# Patient Record
Sex: MALE | Race: WHITE | NOT HISPANIC OR LATINO | Employment: FULL TIME | URBAN - NONMETROPOLITAN AREA
[De-identification: names, ages, dates, MRNs, and addresses within clinical notes are randomized per-mention and may not be internally consistent; named-entity substitution may affect disease eponyms.]

---

## 2019-11-26 ENCOUNTER — OFFICE VISIT (OUTPATIENT)
Dept: INTERNAL MEDICINE | Facility: CLINIC | Age: 54
End: 2019-11-26

## 2019-11-26 VITALS
TEMPERATURE: 97.9 F | HEART RATE: 68 BPM | OXYGEN SATURATION: 98 % | BODY MASS INDEX: 33.8 KG/M2 | SYSTOLIC BLOOD PRESSURE: 124 MMHG | HEIGHT: 69 IN | WEIGHT: 228.2 LBS | DIASTOLIC BLOOD PRESSURE: 76 MMHG

## 2019-11-26 DIAGNOSIS — R06.83 SNORING: ICD-10-CM

## 2019-11-26 DIAGNOSIS — I10 ESSENTIAL HYPERTENSION: ICD-10-CM

## 2019-11-26 DIAGNOSIS — J01.10 ACUTE NON-RECURRENT FRONTAL SINUSITIS: Primary | ICD-10-CM

## 2019-11-26 PROCEDURE — 99203 OFFICE O/P NEW LOW 30 MIN: CPT | Performed by: INTERNAL MEDICINE

## 2019-11-26 RX ORDER — AZITHROMYCIN 250 MG/1
TABLET, FILM COATED ORAL
Qty: 5 TABLET | Refills: 0 | OUTPATIENT
Start: 2019-11-26 | End: 2019-11-30 | Stop reason: HOSPADM

## 2019-11-26 RX ORDER — CLONIDINE HYDROCHLORIDE 0.1 MG/1
0.05 TABLET ORAL ONCE
Status: COMPLETED | OUTPATIENT
Start: 2019-11-26 | End: 2019-11-26

## 2019-11-26 RX ORDER — METHYLPREDNISOLONE 4 MG/1
TABLET ORAL
Qty: 21 EACH | Refills: 0 | OUTPATIENT
Start: 2019-11-26 | End: 2019-11-30 | Stop reason: HOSPADM

## 2019-11-26 RX ORDER — LISINOPRIL 10 MG/1
10 TABLET ORAL DAILY
COMMUNITY
End: 2019-11-30 | Stop reason: SDUPTHER

## 2019-11-26 RX ORDER — CLONIDINE HYDROCHLORIDE 0.1 MG/1
0.1 TABLET ORAL 2 TIMES DAILY PRN
Qty: 10 TABLET | Refills: 0 | Status: SHIPPED | OUTPATIENT
Start: 2019-11-26 | End: 2020-06-09

## 2019-11-26 RX ORDER — PROPRANOLOL HYDROCHLORIDE 10 MG/1
10 TABLET ORAL DAILY
Refills: 11 | COMMUNITY
Start: 2019-11-20

## 2019-11-26 RX ADMIN — CLONIDINE HYDROCHLORIDE 0.05 MG: 0.1 TABLET ORAL at 15:02

## 2019-11-26 NOTE — PROGRESS NOTES
"        Subjective     Chief Complaint   Patient presents with   • Dizziness   • Nausea       History of Present Illness  Patient has been taking his BP medications. Sunday he started feeling off. He initially thought it was fever. No other sickness. His head just feels off. He states that he just feels warm, like he's about to get sick. If he moves around too much then he gets woozy. [place] nauseated when he thought about getting lunch earlier.   Son was sick last week, but patient has not had a cold recently.   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 Dizzy with looking around quickly and getting up from a seated position quickly. Has not missed any steps or fallen. Patient states that he has chronic sinus issues.   No chest pain and no SOA. No chills. No blood in stool or urine.   Since seeing Dr. Noonan, only other thing off was a left sided \"cracked rib.\" Doesn't feel acute but is positional with the pain. Described as an ache.     Patient's PMR from outside medical facility reviewed and noted.        Review of Systems   Constitutional: Negative for chills and fever.   HENT: Negative for ear pain and rhinorrhea.    Respiratory: Negative for cough and shortness of breath.    Cardiovascular: Negative for chest pain and leg swelling.   Gastrointestinal: Negative for constipation, diarrhea, nausea and vomiting.   Genitourinary: Negative for dysuria and hematuria.   Skin: Negative for wound.        Flushing   Neurological: Positive for dizziness.      Frontal pressure with bending forward.     Otherwise complete ROS reviewed and negative except as mentioned in the HPI.    Past Medical History:   Past Medical History:   Diagnosis Date   • Hypertension    Has HX of kidney stones.     Past Surgical History:  Past Surgical History:   Procedure Laterality Date   • KNEE SURGERY  1992     Social History:  reports that he has never smoked. He has never used smokeless tobacco. He reports that he does not use " "drugs.    Family History: family history includes Heart attack in his father; Stroke in his mother.       Allergies:  No Known Allergies  Medications:  Prior to Admission medications    Medication Sig Start Date End Date Taking? Authorizing Provider   lisinopril (PRINIVIL,ZESTRIL) 10 MG tablet Take 10 mg by mouth Daily.   Yes Jose Alejandro Corey MD   propranolol (INDERAL) 10 MG tablet Take 10 mg by mouth Daily. 11/20/19  Yes ProviderJose Alejandro MD       Objective     Vital Signs: /76 (BP Location: Right arm, Patient Position: Sitting, Cuff Size: Adult)   Pulse 68   Temp 97.9 °F (36.6 °C) (Temporal)   Ht 175.3 cm (69\")   Wt 104 kg (228 lb 3.2 oz)   SpO2 98%   BMI 33.70 kg/m²   Physical Exam   Constitutional: He appears well-developed and well-nourished.   HENT:   Head: Normocephalic and atraumatic.   Nose: Nose normal.   Mouth/Throat: Oropharynx is clear and moist.   Eyes: Conjunctivae and EOM are normal.   Neck: Normal range of motion. Neck supple.   Cardiovascular: Normal rate, regular rhythm and normal heart sounds.   Pulmonary/Chest: Effort normal and breath sounds normal.   Abdominal: Soft. Bowel sounds are normal.   Musculoskeletal: Normal range of motion. He exhibits no edema or tenderness.   Strength +5/5 upper and lower extremity bilaterally.    Neurological: He is alert. No cranial nerve deficit.   Skin: Skin is warm and dry.   Psychiatric: He has a normal mood and affect.   Vitals reviewed.    No lateral nystagmus    Patient's Body mass index is 33.7 kg/m². BMI is within normal parameters. No follow-up required..      Results Reviewed:  No results found for: GLUCOSE, BUN, CREATININE, NA, K, CL, CO2, CALCIUM, ALT, AST, WBC, HCT, PLT, CHOL, TRIG, HDL, LDL, LDLHDL, HGBA1C      Assessment / Plan     Assessment/Plan:  1. Acute non-recurrent frontal sinusitis  - azithromycin (ZITHROMAX Z-KADE) 250 MG tablet; Take 2 tablets the first day, then 1 tablet daily for 4 days.  Dispense: 5 tablet; Refill: " 0  - methylPREDNISolone (MEDROL, KADE,) 4 MG tablet; Take as directed on package instructions.  Dispense: 21 each; Refill: 0    2. Essential hypertension  - cloNIDine (CATAPRES) 0.1 MG tablet; Take 1 tablet by mouth 2 (Two) Times a Day As Needed for High Blood Pressure (SBP greater than 160).  Dispense: 10 tablet; Refill: 0  - cloNIDine (CATAPRES) tablet 0.05 mg    3. Snoring with possible apnea episodes  - Home Sleep Study; Future      BP improved after 1/2 clonidine 0.1mg    Return in about 1 week (around 12/3/2019). unless patient needs to be seen sooner or acute issues arise.    Code Status: Full    I have discussed the patient results/orders and and plan/recommendation with them at today's visit.      Faheem Galo, DO   11/26/2019

## 2019-11-30 ENCOUNTER — HOSPITAL ENCOUNTER (EMERGENCY)
Facility: HOSPITAL | Age: 54
Discharge: HOME | End: 2019-11-30
Attending: EMERGENCY MEDICINE | Admitting: EMERGENCY MEDICINE

## 2019-11-30 ENCOUNTER — APPOINTMENT (OUTPATIENT)
Dept: CT IMAGING | Facility: HOSPITAL | Age: 54
End: 2019-11-30

## 2019-11-30 VITALS
SYSTOLIC BLOOD PRESSURE: 125 MMHG | WEIGHT: 220 LBS | DIASTOLIC BLOOD PRESSURE: 69 MMHG | HEART RATE: 72 BPM | BODY MASS INDEX: 32.58 KG/M2 | TEMPERATURE: 97.8 F | HEIGHT: 69 IN | RESPIRATION RATE: 13 BRPM | OXYGEN SATURATION: 94 %

## 2019-11-30 DIAGNOSIS — F41.9 ANXIETY: ICD-10-CM

## 2019-11-30 DIAGNOSIS — I10 HYPERTENSION, UNSPECIFIED TYPE: Primary | ICD-10-CM

## 2019-11-30 LAB
ANION GAP SERPL CALCULATED.3IONS-SCNC: 10 MMOL/L (ref 5–15)
BASOPHILS # BLD AUTO: 0.04 10*3/MM3 (ref 0–0.2)
BASOPHILS NFR BLD AUTO: 0.5 % (ref 0–1.5)
BUN BLD-MCNC: 12 MG/DL (ref 6–20)
BUN/CREAT SERPL: 15 (ref 7–25)
CALCIUM SPEC-SCNC: 9.5 MG/DL (ref 8.6–10.5)
CHLORIDE SERPL-SCNC: 100 MMOL/L (ref 98–107)
CO2 SERPL-SCNC: 26 MMOL/L (ref 22–29)
CREAT BLD-MCNC: 0.8 MG/DL (ref 0.76–1.27)
DEPRECATED RDW RBC AUTO: 39.7 FL (ref 37–54)
EOSINOPHIL # BLD AUTO: 0.27 10*3/MM3 (ref 0–0.4)
EOSINOPHIL NFR BLD AUTO: 3.1 % (ref 0.3–6.2)
ERYTHROCYTE [DISTWIDTH] IN BLOOD BY AUTOMATED COUNT: 13.2 % (ref 12.3–15.4)
GFR SERPL CREATININE-BSD FRML MDRD: 101 ML/MIN/1.73
GLUCOSE BLD-MCNC: 118 MG/DL (ref 65–99)
HCT VFR BLD AUTO: 44.1 % (ref 37.5–51)
HGB BLD-MCNC: 15.5 G/DL (ref 13–17.7)
IMM GRANULOCYTES # BLD AUTO: 0.02 10*3/MM3 (ref 0–0.05)
IMM GRANULOCYTES NFR BLD AUTO: 0.2 % (ref 0–0.5)
LYMPHOCYTES # BLD AUTO: 3.11 10*3/MM3 (ref 0.7–3.1)
LYMPHOCYTES NFR BLD AUTO: 35.6 % (ref 19.6–45.3)
MCH RBC QN AUTO: 29.2 PG (ref 26.6–33)
MCHC RBC AUTO-ENTMCNC: 35.1 G/DL (ref 31.5–35.7)
MCV RBC AUTO: 83.1 FL (ref 79–97)
MONOCYTES # BLD AUTO: 0.86 10*3/MM3 (ref 0.1–0.9)
MONOCYTES NFR BLD AUTO: 9.8 % (ref 5–12)
NEUTROPHILS # BLD AUTO: 4.44 10*3/MM3 (ref 1.7–7)
NEUTROPHILS NFR BLD AUTO: 50.8 % (ref 42.7–76)
NRBC BLD AUTO-RTO: 0 /100 WBC (ref 0–0.2)
PLATELET # BLD AUTO: 239 10*3/MM3 (ref 140–450)
PMV BLD AUTO: 8.9 FL (ref 6–12)
POTASSIUM BLD-SCNC: 4.2 MMOL/L (ref 3.5–5.2)
RBC # BLD AUTO: 5.31 10*6/MM3 (ref 4.14–5.8)
SODIUM BLD-SCNC: 136 MMOL/L (ref 136–145)
TROPONIN T SERPL-MCNC: <0.01 NG/ML (ref 0–0.03)
WBC NRBC COR # BLD: 8.74 10*3/MM3 (ref 3.4–10.8)

## 2019-11-30 PROCEDURE — 70450 CT HEAD/BRAIN W/O DYE: CPT

## 2019-11-30 PROCEDURE — 99284 EMERGENCY DEPT VISIT MOD MDM: CPT

## 2019-11-30 PROCEDURE — 85025 COMPLETE CBC W/AUTO DIFF WBC: CPT | Performed by: EMERGENCY MEDICINE

## 2019-11-30 PROCEDURE — 93005 ELECTROCARDIOGRAM TRACING: CPT | Performed by: EMERGENCY MEDICINE

## 2019-11-30 PROCEDURE — 36415 COLL VENOUS BLD VENIPUNCTURE: CPT

## 2019-11-30 PROCEDURE — 80048 BASIC METABOLIC PNL TOTAL CA: CPT | Performed by: EMERGENCY MEDICINE

## 2019-11-30 PROCEDURE — 93010 ELECTROCARDIOGRAM REPORT: CPT | Performed by: INTERNAL MEDICINE

## 2019-11-30 PROCEDURE — 84484 ASSAY OF TROPONIN QUANT: CPT | Performed by: EMERGENCY MEDICINE

## 2019-11-30 RX ORDER — ALPRAZOLAM 0.5 MG/1
0.5 TABLET ORAL ONCE
Status: COMPLETED | OUTPATIENT
Start: 2019-11-30 | End: 2019-11-30

## 2019-11-30 RX ORDER — ALPRAZOLAM 0.25 MG/1
0.25 TABLET ORAL 2 TIMES DAILY PRN
Qty: 7 TABLET | Refills: 0 | Status: SHIPPED | OUTPATIENT
Start: 2019-11-30 | End: 2020-06-09

## 2019-11-30 RX ORDER — LISINOPRIL 10 MG/1
20 TABLET ORAL DAILY
Qty: 30 TABLET | Refills: 0 | Status: SHIPPED | OUTPATIENT
Start: 2019-11-30

## 2019-11-30 RX ADMIN — ALPRAZOLAM 0.5 MG: 0.5 TABLET ORAL at 21:11

## 2019-12-01 NOTE — ED PROVIDER NOTES
Subjective   53 y/o male arrives for evaluation of elevated blood pressure. The patient has noted his blood pressure to be high recently prompting him to actually visit a local walk in clinic in which he was told to increase his lisinopril and also was given clonidine. He states he has taken medication around 4 pm but noted his BP was very high after this. He has also noted some tingling to his left face. He noted this when he went to the walk in clinic and denies any weakness, ataxia, falls, trauma, actual numbness, cp, sob, fevers, chills, palpitations, flank or back pain, vision or hearing changes or other issues. He arrives in [facility].         Family, social and past history reviewed as below, prior documentation of H and Ps and other documentation are reviewed:    Past Medical History:  No date: Hypertension    Past Surgical History:  1992: KNEE SURGERY    Social History    Socioeconomic History      Marital status:       Spouse name: Not on file      Number of children: Not on file      Years of education: Not on file      Highest education level: Not on file    Tobacco Use      Smoking status: Never Smoker      Smokeless tobacco: Never Used    Substance and Sexual Activity      Drug use: No      Family history: reviewed and noncontributory             Review of Systems   All other systems reviewed and are negative.      Past Medical History:   Diagnosis Date   • Hypertension        No Known Allergies    Past Surgical History:   Procedure Laterality Date   • KNEE SURGERY  1992       Family History   Problem Relation Age of Onset   • Stroke Mother    • Heart attack Father        Social History     Socioeconomic History   • Marital status:      Spouse name: Not on file   • Number of children: Not on file   • Years of education: Not on file   • Highest education level: Not on file   Tobacco Use   • Smoking status: Never Smoker   • Smokeless tobacco: Never Used   Substance and Sexual Activity   • Drug  use: No           Objective   Physical Exam   Constitutional: He is oriented to person, place, and time. He appears well-developed and well-nourished.   HENT:   Head: Normocephalic and atraumatic.   Eyes: Conjunctivae and EOM are normal. Pupils are equal, round, and reactive to light.   Neck: Normal range of motion. Neck supple.   Cardiovascular: Normal rate, regular rhythm, normal heart sounds and intact distal pulses.   Pulmonary/Chest: Effort normal and breath sounds normal.   Abdominal: Soft. Bowel sounds are normal.   Musculoskeletal: Normal range of motion.   Neurological: He is alert and oriented to person, place, and time. He displays normal reflexes. No cranial nerve deficit or sensory deficit. He exhibits normal muscle tone. Coordination normal.   NIH IS 0   Skin: Skin is warm. Capillary refill takes less than 2 seconds.   Psychiatric: He has a normal mood and affect.   Vitals reviewed.      Procedures           ED Course    CT Head Without Contrast   Final Result   1. No acute intracranial abnormality is seen.                                                       This report was finalized on 11/30/2019 21:39 by Dr. Wilver Allen MD.        Labs Reviewed   BASIC METABOLIC PANEL - Abnormal; Notable for the following components:       Result Value    Glucose 118 (*)     All other components within normal limits    Narrative:     GFR Normal >60  Chronic Kidney Disease <60  Kidney Failure <15   CBC WITH AUTO DIFFERENTIAL - Abnormal; Notable for the following components:    Lymphocytes, Absolute 3.11 (*)     All other components within normal limits   TROPONIN (IN-HOUSE) - Normal    Narrative:     Troponin T Reference Range:  <= 0.03 ng/mL-   Negative for AMI  >0.03 ng/mL-     Abnormal for myocardial necrosis.  Clinicians would have to utilize clinical acumen, EKG, Troponin and serial changes to determine if it is an Acute Myocardial Infarction or myocardial injury due to an underlying chronic condition.    CBC  AND DIFFERENTIAL    Narrative:     The following orders were created for panel order CBC & Differential.  Procedure                               Abnormality         Status                     ---------                               -----------         ------                     CBC Auto Differential[[id number]]        Abnormal            Final result                 Please view results for these tests on the individual orders.       ED Course as of Nov 30 2204   Sat Nov 30, 2019 2102 Patient states he has tingling to his left face, given this will check CT and labs.   []   2202 After xanax, patient has no further symptoms, asking for dc. Will increase his lisinopril but he does also admit to a lot of stress at home. Given xanax helped will provide with a few xanax as well.   []      ED Course User Index  [] Esteban Larry MD                  [facility]    Final diagnoses:   Hypertension, unspecified type   Anxiety              Esteban Larry MD  11/30/19 2204

## 2019-12-06 ENCOUNTER — TELEPHONE (OUTPATIENT)
Dept: INTERNAL MEDICINE | Facility: CLINIC | Age: 54
End: 2019-12-06

## 2019-12-06 NOTE — TELEPHONE ENCOUNTER
Upon contacting patient from prior visit, patient stated he had went to the ER the other night with BP and anxiety. He will be going to see his PCP soon.    Patient also stated his medication is working and light headedness is subsiding.

## 2020-06-09 ENCOUNTER — OFFICE VISIT (OUTPATIENT)
Dept: GASTROENTEROLOGY | Facility: CLINIC | Age: 55
End: 2020-06-09

## 2020-06-09 VITALS
WEIGHT: 223 LBS | OXYGEN SATURATION: 98 % | HEIGHT: 70 IN | HEART RATE: 80 BPM | TEMPERATURE: 98 F | BODY MASS INDEX: 31.92 KG/M2 | SYSTOLIC BLOOD PRESSURE: 132 MMHG | DIASTOLIC BLOOD PRESSURE: 74 MMHG

## 2020-06-09 DIAGNOSIS — Z12.11 COLON CANCER SCREENING: Primary | ICD-10-CM

## 2020-06-09 PROCEDURE — 99203 OFFICE O/P NEW LOW 30 MIN: CPT | Performed by: NURSE PRACTITIONER

## 2020-06-09 RX ORDER — ASCORBIC ACID 500 MG
500 TABLET ORAL DAILY
COMMUNITY

## 2020-06-09 NOTE — PROGRESS NOTES
Chief Complaint   Patient presents with   • Colon Cancer Screening     Pt presents today for evaluation for screening colonoscopy     Subjective   HPI  Bob Navarro is a 55 y.o. male who presents as a referral for preventative maintenance. He has no complaints of nausea or vomiting. No change in bowels. No wt loss. No BRBPR. No melena. There is no family hx for colon cancer. No abdominal pain. There was no Cologuard screening this year. The patient has not had a colonoscopy in the past.    Past Medical History:   Diagnosis Date   • Asthma    • Generalized anxiety disorder    • Hypertension    • Hypertriglyceridemia    • Hypogonadism male    • Spastic colon    • Venous insufficiency      Past Surgical History:   Procedure Laterality Date   • KNEE SURGERY  1992       Current Outpatient Medications:   •  lisinopril (PRINIVIL,ZESTRIL) 10 MG tablet, Take 2 tablets by mouth Daily., Disp: 30 tablet, Rfl: 0  •  Multiple Vitamins-Minerals (MULTIVITAMIN ADULT PO), Take 1 capsule by mouth Daily., Disp: , Rfl:   •  propranolol (INDERAL) 10 MG tablet, Take 10 mg by mouth Daily., Disp: , Rfl: 11  •  vitamin C (ASCORBIC ACID) 500 MG tablet, Take 500 mg by mouth Daily., Disp: , Rfl:   No Known Allergies  Social History     Socioeconomic History   • Marital status:      Spouse name: Not on file   • Number of children: Not on file   • Years of education: Not on file   • Highest education level: Not on file   Tobacco Use   • Smoking status: Never Smoker   • Smokeless tobacco: Never Used   Substance and Sexual Activity   • Alcohol use: Yes     Comment: Rarely-maybe once a year   • Drug use: No     Family History   Problem Relation Age of Onset   • Stroke Mother    • Heart attack Father    • Colon polyps Neg Hx    • Colon cancer Neg Hx    • Esophageal cancer Neg Hx    • Liver cancer Neg Hx    • Liver disease Neg Hx    • Rectal cancer Neg Hx    • Stomach cancer Neg Hx        REVIEW OF SYSTEMS  General: well appearing, no fever chills  "or sweats, no unexplained wt loss  HEENT: no acute visual or hearing disturbances  Cardiovascular: No chest pain or palpitations  Pulmonary: No shortness of breath, coughing, wheezing or hemoptysis  : No burning, urgency, hematuria, or dysuria  Musculoskeletal: No joint pain or stiffness  Peripheral: no edema  Skin: No lesions or rashes  Neuro: No dizziness, headaches, stroke, syncope  Endocrine: No hot or cold intolerances  Hematological: No blood dyscrasias    Objective   Vitals:    06/09/20 1345   BP: 132/74   BP Location: Left arm   Patient Position: Sitting   Cuff Size: Adult   Pulse: 80   Temp: 98 °F (36.7 °C)   TempSrc: Tympanic   SpO2: 98%   Weight: 101 kg (223 lb)   Height: 176.5 cm (69.5\")     Body mass index is 32.46 kg/m².    PHYSICAL EXAM  General: age appropriate well nourished well appearing, no acute distress  Head: normocephalic and atraumatic  Global assessment-supple  Neck-No JVD noted, no lymphadenopathy  Pulmonary-clear to auscultation bilaterally, normal respiratory effort  Cardiovascular-normal rate and rhythm, normal heart sounds, S1 and S2 noted  Abdomen-soft, non tender, non distended, normal bowel sounds all 4 quadrants, no hepatosplenomegaly noted  Extremities-No clubbing cyanosis or edema  Neuro-Non focal, converses appropriately, awake, alert, oriented    Lab Results - Last 18 Months   Lab Units 11/30/19  2107   GLUCOSE mg/dL 118*   BUN mg/dL 12   CREATININE mg/dL 0.80   SODIUM mmol/L 136   POTASSIUM mmol/L 4.2   CHLORIDE mmol/L 100   CO2 mmol/L 26.0       Lab Results - Last 18 Months   Lab Units 11/30/19  2107   HEMOGLOBIN g/dL 15.5   HEMATOCRIT % 44.1   MCV fL 83.1   WBC 10*3/mm3 8.74   RDW % 13.2   MPV fL 8.9   PLATELETS 10*3/mm3 239         Imaging Results (Most Recent)     None        Assessment/Plan   Bob was seen today for colon cancer screening.    Diagnoses and all orders for this visit:    Colon cancer screening    The patient states that at this time he does not want to " "proceed to a colonoscopy and will call our office when he discusses this with his wife.  He states that he just got \"a new job and I do not feel that I can take 3 days off to quarantine myself for the screening for COVID.\"  * Surgery not found *       Body mass index is 32.46 kg/m². Patient's Body mass index is 32.46 kg/m². BMI is above normal parameters. Recommendations include: nutrition counseling.      All risks, benefits, alternatives, and indications of colonoscopy procedure have been discussed with the patient. Risks to include perforation of the colon requiring possible surgery or colostomy, risk of bleeding from biopsies or removal of colon tissue, possibility of missing a colon polyp or cancer, or adverse drug reaction.  Benefits to include the diagnosis and management of disease of the colon and rectum. Alternatives to include barium enema, radiographic evaluation, lab testing or no intervention. Pt verbalizes understanding and agrees.   "

## 2025-04-24 ENCOUNTER — HOSPITAL ENCOUNTER (OUTPATIENT)
Dept: CARDIOLOGY | Facility: HOSPITAL | Age: 60
Discharge: HOME | End: 2025-04-24
Payer: COMMERCIAL

## 2025-04-24 DIAGNOSIS — R93.1 ABNORMAL FINDINGS DIAGNOSTIC IMAGING OF HEART AND CORONARY CIRCULATION: Primary | ICD-10-CM

## 2025-04-24 DIAGNOSIS — R93.1 ABNORMAL FINDINGS DIAGNOSTIC IMAGING OF HEART AND CORONARY CIRCULATION: ICD-10-CM

## 2025-04-30 ENCOUNTER — HOSPITAL ENCOUNTER (OUTPATIENT)
Dept: CARDIOLOGY | Facility: HOSPITAL | Age: 60
Discharge: HOME | End: 2025-04-30

## 2025-04-30 DIAGNOSIS — R93.1 ABNORMAL FINDINGS ON DIAGNOSTIC IMAGING OF HEART AND CORONARY CIRCULATION: ICD-10-CM

## 2025-05-01 ENCOUNTER — OFFICE VISIT (OUTPATIENT)
Dept: CARDIOLOGY | Facility: CLINIC | Age: 60
End: 2025-05-01
Payer: COMMERCIAL

## 2025-05-01 VITALS
BODY MASS INDEX: 27.85 KG/M2 | OXYGEN SATURATION: 99 % | HEIGHT: 69 IN | WEIGHT: 188 LBS | SYSTOLIC BLOOD PRESSURE: 138 MMHG | DIASTOLIC BLOOD PRESSURE: 62 MMHG | HEART RATE: 64 BPM

## 2025-05-01 DIAGNOSIS — I35.1 NONRHEUMATIC AORTIC VALVE INSUFFICIENCY: Primary | ICD-10-CM

## 2025-05-01 DIAGNOSIS — E78.5 HYPERLIPIDEMIA, UNSPECIFIED HYPERLIPIDEMIA TYPE: ICD-10-CM

## 2025-05-01 DIAGNOSIS — I10 ESSENTIAL HYPERTENSION: ICD-10-CM

## 2025-05-01 DIAGNOSIS — Z91.89 AT INCREASED RISK FOR CARDIOVASCULAR DISEASE: ICD-10-CM

## 2025-05-01 PROBLEM — E11.9 TYPE 2 DIABETES MELLITUS: Status: ACTIVE | Noted: 2023-12-13

## 2025-05-01 PROCEDURE — 99204 OFFICE O/P NEW MOD 45 MIN: CPT | Performed by: INTERNAL MEDICINE

## 2025-05-01 PROCEDURE — 93000 ELECTROCARDIOGRAM COMPLETE: CPT | Performed by: INTERNAL MEDICINE

## 2025-05-01 RX ORDER — TIRZEPATIDE 5 MG/.5ML
INJECTION, SOLUTION SUBCUTANEOUS
COMMUNITY

## 2025-05-01 NOTE — PROGRESS NOTES
[facility] - CARDIOLOGY  New Patient Initial Outpatient Evaluation    Primary Care Physician: Dread Noonan MD    Subjective     Chief Complaint: valvular heart disease    History of Present Illness  The patient is a 60-year-old male who presents for referral due to an abnormal echocardiogram. He was referred by Dr. Isidro Choi following an [facility], which reported a normal ejection fraction with moderate aortic regurgitation and a detected murmur.    A significant weight loss of approximately 30 pounds is reported, achieved with the aid of Mounjaro. He mentions that his A1c levels have been unstable recently. He has been diagnosed with diabetes, but his A1c levels are currently well-controlled with Mounjaro. His most recent A1c level was 5.5.    During his annual physical examination, a murmur was detected, prompting further investigation due to his family history. He was previously evaluated by Dr. Mares at , who recommended further assessment of the murmur by Dr. Noonan's office. No symptoms of shortness of breath, chest tightness, pressure, or heaviness during physical activity are reported. He maintains an active lifestyle, although not as rigorous as during his  service, and engages in regular walking exercises. No exercise outside of his job is performed.    A tendency towards anxiety is reported, which is managed with propranolol, typically taking 2 doses of 5 mg or 10 mg in the morning along with lisinopril. Occasionally, an additional dose is required if excessive caffeine is consumed. Caffeine is avoided in the evening to ensure good sleep quality.    Cholesterol levels have been consistently below 200, with LDL levels around 90 and HDL levels between 60 and 80. Supplementation with omega-6 and MCT oil is used to balance omega-3 intake. He was previously on atorvastatin 10 mg but discontinued it due to satisfactory cholesterol  levels.    SOCIAL HISTORY  Occupations: Served in the  for 10 years; currently works for the state doing inspections.  Exercise: Walks and does inspections for work but does not engage in intentional exercise outside of work.  Diet: Drinks unsweet tea, mostly in the mornings.  Tobacco: Non-smoker.  Coffee/Tea/Caffeine-containing Drinks: Drinks unsweet tea, mostly in the mornings. Avoids it in the evening to sleep well.      Review of Systems   Constitutional: Negative for malaise/fatigue.   Cardiovascular:  Negative for chest pain, claudication, dyspnea on exertion, leg swelling, near-syncope, orthopnea, palpitations, paroxysmal nocturnal dyspnea and syncope.   Respiratory:  Negative for shortness of breath.    Hematologic/Lymphatic: Does not bruise/bleed easily.        Otherwise complete ROS reviewed and negative except as mentioned in the HPI.      Past Medical History:   Past Medical History:   Diagnosis Date    Diabetes mellitus     Generalized anxiety disorder     Heart murmur     Hyperlipidemia 4/10/2023    Hypertension     Hypogonadism male     Spastic colon     Venous insufficiency        Past Surgical History:  Past Surgical History:   Procedure Laterality Date    KNEE SURGERY  1992       Family History: family history includes Heart attack in his father; Heart disease in his father and mother; Stroke in his mother.    Social History:  reports that he has never smoked. He has been exposed to tobacco smoke. He has never used smokeless tobacco. He reports current alcohol use. He reports that he does not use drugs.    Medications:  Prior to Admission medications    Medication Sig Start Date End Date Taking? Authorizing Provider   Cholecalciferol (D3 PO) Take  by mouth.   Yes ProviderJose Alejandro MD   lisinopril (PRINIVIL,ZESTRIL) 10 MG tablet Take 2 tablets by mouth Daily. 11/30/19  Yes Esteban Larry MD Mounjaro 5 MG/0.5ML solution auto-injector INJECT 5 MG SUBCUTANEOUSLY ONCE A WEEK FOR  "DIABETES.   Yes Provider, MD Jose Alejandro   Multiple Vitamins-Minerals (MULTIVITAMIN ADULT PO) Take 1 tablet by mouth Daily.   Yes Provider, MD Jose Alejandro   propranolol (INDERAL) 10 MG tablet Take 1 tablet by mouth Daily. 11/20/19  Yes Leora, MD Jose Alejandro   vitamin C (ASCORBIC ACID) 500 MG tablet Take 1 tablet by mouth Daily.   Yes Leora, MD Jose Alejandro     Allergies:  No Known Allergies    Objective     Vital Signs: /62   Pulse 64   Ht 175.3 cm (69\")   Wt 85.3 kg (188 lb)   SpO2 99%   BMI 27.76 kg/m²     Vitals and nursing note reviewed.   Constitutional:       General: Not in acute distress.     Appearance: Not in distress.   Neck:      Vascular: No JVD or JVR. JVD normal.   Pulmonary:      Effort: Pulmonary effort is normal.      Breath sounds: Normal breath sounds.   Cardiovascular:      Normal rate. Regular rhythm.      Murmurs: There is a grade 3/4 high frequency blowing decrescendo, early diastolic murmur at the URSB, radiating to the apex.      No gallop.  No rub.   Pulses:     Intact distal pulses.   Edema:     Peripheral edema absent.   Skin:     General: Skin is warm and dry.   Neurological:      Mental Status: Alert, oriented to person, place, and time and oriented to person, place and time.       Physical Exam      Results Reviewed:  Results  Labs   - A1c: 5.5   - LDL cholesterol: 03/2024, 54    Imaging reviewed:  - Report of echocardiogram is reviewed the official report stating the left ventricle is normal in size with EF of 55 to 60%.  There reports moderate aortic valve leaflet thickening with moderate regurgitation.  - Upon my independent review of the images they were uploaded to our system from this exam (performed at another facility), the parasternal long axis views do raise concern for a mildly dilated left ventricle.  Also, I measure the vena contractor at 0.8, which would be consistent with severe aortic regurgitation.  The continuous-wave Doppler assessment of the " "regurgitant jet never appears to be a very clear envelope, but pressure half-times obtained are consistent with moderate regurgitation.        ECG 12 Lead    Date/Time: 5/1/2025 2:04 PM  Performed by: Simon Ghosh MD    Authorized by: Simon Ghosh MD  Comparison: compared with previous ECG from 11/30/2019  Comparison to previous ECG: Voltage criteria for LVH are now present, as are nonspecific T wave abnormalities  Rhythm: sinus rhythm  Rate: normal  BPM: 64  Other findings: T wave abnormality and left ventricular hypertrophy    Clinical impression: abnormal EKG            Lab Results   Component Value Date    TRIG 157 (H) 01/12/2022    HDL 40 01/12/2022     No results found for: \"HGBA1C\"        Assessment / Plan        Problem List Items Addressed This Visit          Cardiac and Vasculature    Essential hypertension    Hyperlipidemia    Nonrheumatic aortic valve insufficiency - Primary    Relevant Orders    Adult Transthoracic Echo Complete W/ Cont if Necessary Per Protocol       Other    At increased risk for cardiovascular disease       Assessment & Plan  1. Nonrheumatic aortic regurgitation.  He presents with a 3 out of 4 diastolic murmur on examination, despite a recent echocardiogram indicating normal left ventricular size and function.   - The report of the echocardiogram states the aortic regurgitation is moderate, and the left ventricular size and function are both normal.  However, upon my independent review of these images, I am concerned that the severity of aortic regurgitation is underreported in the left ventricular size is in fact bordering on dilated with very subtle reduction in global function.  - As such, I will actually recommend a follow-up echocardiogram here in September (1 year removed from his [facility]) and a visit with me thereafter, for closer surveillance and would otherwise usually be recommended for moderate aortic regurgitation with a normal LVEF and normal " size of the left ventricle     - We did discuss indications for valvular intervention    2. Cardiovascular risk reduction.  He is diabetic but currently has a well-controlled A1c level, managed with Mounjaro. He was previously on atorvastatin 10 mg but discontinued its use. Cholesterol levels from 03/2024, obtained while he was on atorvastatin, were reviewed and showed an excellent LDL level of 54. He believes his LDL level increased to the 90s after discontinuing atorvastatin, but these levels would still be considered well-controlled according to ATP III guidelines for primary care physicians. He was advised to resume atorvastatin therapy due to its class effect and the potential for an LDL level of 54 to significantly reduce his risk of myocardial infarction. Continued close follow-up with his primary care physician for further risk factor modification is recommended. He is scheduled for a follow-up visit in one year, during which another echocardiogram will be performed. However, he has been instructed to contact us sooner if he experiences any exertional limiting symptoms.    3. Diabetes Mellitus.  His diabetes is well-controlled with an A1c of 5.5, managed with Mounjaro. He is advised to continue his current medication regimen and maintain regular follow-ups with his primary care physician for ongoing management of his diabetes.    4. Hyperlipidemia.  He was previously on atorvastatin 10 mg but stopped taking it. His LDL was 54 while on the medication and has risen to the 90s after discontinuation. He is encouraged to resume atorvastatin to achieve better LDL control and reduce cardiovascular risk.    Follow-up  The patient will follow up after repeat echocardiogram in September of this year.      Transcribed from ambient dictation for Simon Ghosh MD by Simon Ghosh MD.  05/01/25   14:39 CDT    Patient or patient representative verbalized consent to the visit recording.

## 2025-05-02 PROBLEM — Z91.89 AT INCREASED RISK FOR CARDIOVASCULAR DISEASE: Status: ACTIVE | Noted: 2025-05-02

## 2025-05-05 ENCOUNTER — TELEPHONE (OUTPATIENT)
Dept: CARDIOLOGY | Facility: CLINIC | Age: 60
End: 2025-05-05
Payer: COMMERCIAL

## 2025-05-05 NOTE — TELEPHONE ENCOUNTER
Patient asks if he needs to premedicate before dental cleaning. Per Julia Delaney RN only those who have had valve replacement need premedication. Patient is notified. Willow Matthews MA

## 2025-05-05 NOTE — TELEPHONE ENCOUNTER
Simon Ghosh MD Hawkins, Tammie, MA  Please let patient know that I was able to go back after our visit and reviewed the actual images of the echocardiogram performed in [facility].  I am concerned that both the degree of aortic regurgitation as well as the size and function of the main pumping chamber may be a bit worse than was reported and what we discussed in our visit, so I'd like to actually perform his echocardiogram in September of this year and see him again thereafter.     ------------------------------------------------------  Notified patient of Dr Ghosh' advice and that scheduling will be contacting him to move up the echocardiogram from next year to September of this year. He voiced understanding and appreciation.Willow Matthews MA